# Patient Record
Sex: MALE | Race: OTHER | HISPANIC OR LATINO | URBAN - METROPOLITAN AREA
[De-identification: names, ages, dates, MRNs, and addresses within clinical notes are randomized per-mention and may not be internally consistent; named-entity substitution may affect disease eponyms.]

---
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
## 2022-07-07 NOTE — ED PEDIATRIC TRIAGE NOTE - STATUS:
"Glasses prescription given - optional  Use artificial tears up to four times a day (like Refresh Optive, Systane Balance, TheraTears, or generic artificial tears are ok. Avoid \"get the red out\" drops).   Possible posterior vitreous detachment (sudden onset large floater and/or flashing lights) left eye discussed.   Continue observation with regard to glaucoma suspect status.   Return visit 6 months for an intraocular pressure check, glaucoma OCT, and Blakely Visual Field.   Dre Larios M.D.  [phone]   "
Intact

## 2023-08-11 NOTE — ED PEDIATRIC NURSE NOTE - CARDIO WDL
Patient reports headache 5/10  And reports itching around eyes, face and neck. Medicated with tylenol 650 mg po and benadryl. Cyril Casey Normal rate, regular rhythm

## 2024-11-22 NOTE — ED PEDIATRIC NURSE NOTE - CAS DISCH TRANSFER METHOD
Copied from CRM #[id number]. Topic: MW Messaging - MW Patient Request  >> Nov 22, 2024  2:33 PM Mandy MALDONADO wrote:  Jessica Pineda called requesting to send a general message to clinician.   Verified issue is NOT regarding a symptom(s) requiring routine or emergent triage. Verified another message template type and CRM does not apply.    Selected 'Wrap Up CRM' and created new Telephone Encounter after clicking 'Convert to Clinical Call'. Selected appropriate Reason for Call.  Sent Pt message template and routed as routine priority per Clinician KB page to appropriate clinician pool. Readback full message.    -- DO NOT REPLY / DO NOT REPLY ALL --  -- This inbox is not monitored. If this was sent to the wrong provider or department, reroute message to P ECO Reroute pool. --  -- Message is from Engagement Center Operations (ECO) --      Message Type:  Medication Question or Concern    Medication Question:  stelara PFS 90 ml has been approved   Preferred pharmacy verified and selected.            [facility] - [place], [place] - [address]  [address] [place] [address]  Phone: [phone] Fax: [phone]     Patient has been advised the message will be addressed within 2-3 business days.               Private car